# Patient Record
Sex: FEMALE | Race: WHITE | NOT HISPANIC OR LATINO | Employment: FULL TIME | URBAN - METROPOLITAN AREA
[De-identification: names, ages, dates, MRNs, and addresses within clinical notes are randomized per-mention and may not be internally consistent; named-entity substitution may affect disease eponyms.]

---

## 2023-09-22 ENCOUNTER — HOSPITAL ENCOUNTER (OUTPATIENT)
Dept: DATA CONVERSION | Facility: HOSPITAL | Age: 43
Discharge: HOME | End: 2023-09-22
Payer: COMMERCIAL

## 2023-09-22 DIAGNOSIS — Z90.3 ACQUIRED ABSENCE OF STOMACH (PART OF): ICD-10-CM

## 2023-09-22 DIAGNOSIS — K21.9 GASTRO-ESOPHAGEAL REFLUX DISEASE WITHOUT ESOPHAGITIS: ICD-10-CM

## 2023-09-26 ENCOUNTER — HOSPITAL ENCOUNTER (OUTPATIENT)
Dept: DATA CONVERSION | Facility: HOSPITAL | Age: 43
Discharge: HOME | End: 2023-09-26
Payer: COMMERCIAL

## 2023-09-26 DIAGNOSIS — Z12.31 ENCOUNTER FOR SCREENING MAMMOGRAM FOR MALIGNANT NEOPLASM OF BREAST: ICD-10-CM

## 2023-09-26 PROBLEM — K90.9 MALABSORPTION (HHS-HCC): Status: ACTIVE | Noted: 2023-09-26

## 2023-09-26 PROBLEM — G43.909 MIGRAINE: Status: ACTIVE | Noted: 2023-09-26

## 2023-09-26 PROBLEM — N92.0 MENORRHAGIA WITH REGULAR CYCLE: Status: ACTIVE | Noted: 2023-09-26

## 2023-09-26 PROBLEM — F32.A DEPRESSION: Status: ACTIVE | Noted: 2023-09-26

## 2023-09-26 PROBLEM — E21.3 HYPERPARATHYROIDISM (MULTI): Status: ACTIVE | Noted: 2023-09-26

## 2023-09-26 PROBLEM — R92.8 ABNORMAL FINDINGS ON DIAGNOSTIC IMAGING OF BREAST: Status: ACTIVE | Noted: 2023-09-26

## 2023-09-26 PROBLEM — D72.829 LEUCOCYTOSIS: Status: ACTIVE | Noted: 2023-09-26

## 2023-09-26 PROBLEM — E55.9 VITAMIN D DEFICIENCY: Status: ACTIVE | Noted: 2023-09-26

## 2023-09-26 PROBLEM — F41.9 ANXIETY: Status: ACTIVE | Noted: 2023-09-26

## 2023-09-26 PROBLEM — E66.9 OBESITY: Status: ACTIVE | Noted: 2023-09-26

## 2023-09-26 PROBLEM — K21.9 GASTROESOPHAGEAL REFLUX DISEASE: Status: ACTIVE | Noted: 2023-09-26

## 2023-09-26 RX ORDER — OMEPRAZOLE 40 MG/1
40 CAPSULE, DELAYED RELEASE ORAL
COMMUNITY
Start: 2023-02-10 | End: 2024-03-06 | Stop reason: ALTCHOICE

## 2023-09-26 RX ORDER — BUPROPION HYDROCHLORIDE 300 MG/1
300 TABLET ORAL EVERY MORNING
COMMUNITY
Start: 2017-08-15 | End: 2024-03-06 | Stop reason: ALTCHOICE

## 2023-09-26 RX ORDER — ONDANSETRON 4 MG/1
4 TABLET, FILM COATED ORAL EVERY 6 HOURS PRN
COMMUNITY
End: 2023-11-07 | Stop reason: ALTCHOICE

## 2023-09-26 RX ORDER — IBUPROFEN 800 MG/1
800 TABLET ORAL EVERY 8 HOURS PRN
COMMUNITY
End: 2024-03-06 | Stop reason: ALTCHOICE

## 2023-09-26 RX ORDER — ASPIRIN 81 MG
100 TABLET, DELAYED RELEASE (ENTERIC COATED) ORAL DAILY
COMMUNITY

## 2023-09-26 RX ORDER — CYANOCOBALAMIN 1000 UG/ML
1 INJECTION, SOLUTION INTRAMUSCULAR; SUBCUTANEOUS
COMMUNITY
End: 2024-02-06 | Stop reason: SDUPTHER

## 2023-09-26 RX ORDER — ENOXAPARIN SODIUM 100 MG/ML
40 INJECTION SUBCUTANEOUS DAILY
COMMUNITY
End: 2023-11-07 | Stop reason: ALTCHOICE

## 2023-09-26 RX ORDER — SIMETHICONE 80 MG
80 TABLET,CHEWABLE ORAL EVERY 6 HOURS PRN
COMMUNITY
End: 2023-11-07 | Stop reason: ALTCHOICE

## 2023-09-26 RX ORDER — SUMATRIPTAN SUCCINATE 100 MG/1
100 TABLET ORAL DAILY PRN
COMMUNITY
Start: 2017-10-13 | End: 2024-03-06 | Stop reason: ALTCHOICE

## 2023-09-26 RX ORDER — OXYCODONE HYDROCHLORIDE 5 MG/1
5 TABLET ORAL EVERY 6 HOURS PRN
COMMUNITY
End: 2023-11-07 | Stop reason: ALTCHOICE

## 2023-10-05 ENCOUNTER — OFFICE VISIT (OUTPATIENT)
Dept: SURGERY | Facility: CLINIC | Age: 43
End: 2023-10-05
Payer: COMMERCIAL

## 2023-10-05 VITALS
SYSTOLIC BLOOD PRESSURE: 120 MMHG | WEIGHT: 153 LBS | HEART RATE: 58 BPM | HEIGHT: 63 IN | DIASTOLIC BLOOD PRESSURE: 75 MMHG | BODY MASS INDEX: 27.11 KG/M2

## 2023-10-05 DIAGNOSIS — K44.9 HIATAL HERNIA: ICD-10-CM

## 2023-10-05 DIAGNOSIS — K90.89 OTHER SPECIFIED INTESTINAL MALABSORPTION (HHS-HCC): ICD-10-CM

## 2023-10-05 DIAGNOSIS — E21.3 HYPERPARATHYROIDISM (MULTI): ICD-10-CM

## 2023-10-05 DIAGNOSIS — K21.00 GASTROESOPHAGEAL REFLUX DISEASE WITH ESOPHAGITIS WITHOUT HEMORRHAGE: Primary | ICD-10-CM

## 2023-10-05 DIAGNOSIS — R11.10 REGURGITATION OF STOMACH CONTENTS: ICD-10-CM

## 2023-10-05 DIAGNOSIS — E55.9 VITAMIN D DEFICIENCY: ICD-10-CM

## 2023-10-05 PROCEDURE — 99215 OFFICE O/P EST HI 40 MIN: CPT | Performed by: SURGERY

## 2023-10-05 PROCEDURE — 1036F TOBACCO NON-USER: CPT | Performed by: SURGERY

## 2023-10-05 RX ORDER — FAMOTIDINE 40 MG/1
40 TABLET, FILM COATED ORAL 2 TIMES DAILY
Qty: 60 TABLET | Refills: 11 | Status: SHIPPED | OUTPATIENT
Start: 2023-10-05 | End: 2024-03-06 | Stop reason: WASHOUT

## 2023-10-05 RX ORDER — IBUPROFEN 200 MG
2 CAPSULE ORAL 2 TIMES DAILY
COMMUNITY

## 2023-10-05 ASSESSMENT — ENCOUNTER SYMPTOMS
DEPRESSION: 0
LOSS OF SENSATION IN FEET: 0
OCCASIONAL FEELINGS OF UNSTEADINESS: 0

## 2023-10-05 ASSESSMENT — COLUMBIA-SUICIDE SEVERITY RATING SCALE - C-SSRS
2. HAVE YOU ACTUALLY HAD ANY THOUGHTS OF KILLING YOURSELF?: NO
6. HAVE YOU EVER DONE ANYTHING, STARTED TO DO ANYTHING, OR PREPARED TO DO ANYTHING TO END YOUR LIFE?: NO
1. IN THE PAST MONTH, HAVE YOU WISHED YOU WERE DEAD OR WISHED YOU COULD GO TO SLEEP AND NOT WAKE UP?: NO

## 2023-10-05 ASSESSMENT — PATIENT HEALTH QUESTIONNAIRE - PHQ9
SUM OF ALL RESPONSES TO PHQ9 QUESTIONS 1 AND 2: 0
1. LITTLE INTEREST OR PLEASURE IN DOING THINGS: NOT AT ALL
2. FEELING DOWN, DEPRESSED OR HOPELESS: NOT AT ALL

## 2023-10-05 ASSESSMENT — PAIN SCALES - GENERAL: PAINLEVEL: 0-NO PAIN

## 2023-10-05 NOTE — PROGRESS NOTES
Bariatric Surgery F/U:          Seen at ER after surgery? No.  Hospital admission? DEHYDRATION  Bariatric reoperation? No.  Bariatric intervention? No.  Was anticoagulation initiated for presumed/confirmed Vein Thrombosis/PE? No.  Was an incisional hernia noted on exam? No.  Sleep Apnea? No.  Still using C-PAP? No.  GERD req. meds? YES Hyperlipidemia? No.  Still taking Cholesterol med? No.  Hypertension? No.  Still taking HTN med? No.  Number of Anti-hypertensive Medications: 0.  Diabetes? No.  Still taking DM med? No.  Current DM medication type: _____.         CONSTITUTIONAL:          Chills No.  Fatigue No.  Fever No.         CARDIOLOGY:          Negative for dizziness, chest pain, palpitations, shortness of breath.         RESPIRATORY:          Negative for chest congestion, cough, wheezing.         GASTROENTEROLOGY:          Food Intolerance No.  Acid reflux No.  Abdominal pain No.  Black stools No.  Constipation No.  Diarrhea No.  Loss of appetite no.  Nausea No.  Vomiting No.         UROLOGY:          Negative for dysuria, urinary urgency, kidney stones.         PSYCHOLOGY:          Negative for depression, anxiety, high stress level.     Start wt:  234     ideal wt: 137  start excess wt:  103   total wt loss:      ewl:         %

## 2023-10-05 NOTE — H&P
History Of Present Illness  Eileen Estrada is a 43 y.o. female with a history of vertical sleeve gastrectomy.  She has had progressive reflux over the past year.  She says her reflux started after her abdominoplasty.  Initially she was self treating with tums.  Her symptoms progressed so she started taking OTC pepcid.  She then graduated to OTC ppi.  When this did not work she received a prescription for PPI 40 mg daily.  She is taking a second dose of PPI 40 mg 3 times per week.  In addition, she is taking 2 tums five days per week.  She experiences subxiphoid burning in the evening when she relaxes prior to going to bed.  She has dysphagia to eggs and chicken.  She used to eat hardboiled eggs as part of her routine.  She now feels as if an egg will get stuck and she will need to vomit.  She tells me twice over the past six months, she has experienced vomiting bile when she bent over to pick something up off the floor.  She ends up vomiting once per week.  She had an upper GI showing a small hiatal hernia.  She had an EGD showing a normal volume and shape gastric sleeve without stricture.  We reviewed need for lifelong supplementation after weight loss surgery.     Past Medical History  She has a past medical history of Acid reflux, Anxiety, Depression, ETOH abuse, History of sleep study, and Migraine.    Surgical History  She has a past surgical history that includes Laparoscopic partial gastrectomy (07/05/2016); Hiatal hernia repair (12/27/2016); Hysterectomy (02/2022); Abdominal surgery (02/2022); and Bilateral salpingoophorectomy (Bilateral, 2017).     Social History  She reports that she has quit smoking. Her smoking use included cigarettes. She has never used smokeless tobacco. She reports that she does not drink alcohol and does not use drugs.    Family History  Family History   Problem Relation Name Age of Onset    Stroke Mother      Other (CVA) Mother      Other (Chemical Dependancy) Father      No Known  "Problems Brother      No Known Problems Daughter      No Known Problems Son      No Known Problems Maternal Grandmother      Coronary artery disease Maternal Grandfather      Breast cancer Paternal Grandmother      No Known Problems Paternal Grandfather          Allergies  Patient has no known allergies.    Review of Systems     Physical Exam     Last Recorded Vitals  Blood pressure 120/75, pulse 58, height 1.588 m (5' 2.5\"), weight 69.4 kg (153 lb).         Assessment/Plan   Problem List Items Addressed This Visit             ICD-10-CM       Endocrine/Metabolic    Vitamin D deficiency E55.9    Hyperparathyroidism (CMS/HCC) E21.3       Gastrointestinal and Abdominal    Gastroesophageal reflux disease - Primary K21.9    Relevant Medications    famotidine (Pepcid) 40 mg tablet    Malabsorption K90.9    Hiatal hernia K44.9    Regurgitation of stomach contents R11.10       I recommended conversion of VSG to RYGB with likely hiatal hernia repair.  We reviewed her UGI images as well as the EGD findings.  I explained the suspected cause of her symptoms.  We discussed that the two options would be EMOT to assess if she has normal esophageal function and if so that she could have hiatal hernia repair alone.  I explained that I could not predict the percentage likelihood that this would eliminate her symptoms and the durability of this option.  She is most comfortable with proceeding with conversion.  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  We reviewed in detail the differences between VSG and RYGB.  We reviewed dumping syndrome, sensitivity to alcohol, lifelong risk of gastrojejunal ulcer, need to avoid smoking, ASA, NSAIDs, steroids for life, and need for supplementation and routine labwork.  We reviewed the risks of surgery-death, VTE, bleeding, GI tract injury, adhesions, SBO, need for additional operations, chronic pain, nutritional deficiencies.  She would like to proceed.       I spent 60 " minutes in the professional and overall care of this patient.      Jordy Greco MD

## 2023-10-08 PROBLEM — R11.10 REGURGITATION OF STOMACH CONTENTS: Status: ACTIVE | Noted: 2023-10-08

## 2023-10-08 PROBLEM — K44.9 HIATAL HERNIA: Status: ACTIVE | Noted: 2023-10-08

## 2023-10-23 ENCOUNTER — PHARMACY VISIT (OUTPATIENT)
Dept: PHARMACY | Facility: CLINIC | Age: 43
End: 2023-10-23
Payer: COMMERCIAL

## 2023-10-23 PROCEDURE — RXMED WILLOW AMBULATORY MEDICATION CHARGE

## 2023-10-25 RX ORDER — SUMATRIPTAN 50 MG/1
TABLET, FILM COATED ORAL
Qty: 30 TABLET | Refills: 2 | OUTPATIENT
Start: 2023-10-25 | End: 2024-10-24

## 2023-10-27 ENCOUNTER — HOSPITAL ENCOUNTER (OUTPATIENT)
Facility: HOSPITAL | Age: 43
Setting detail: SURGERY ADMIT
End: 2023-10-27
Attending: SURGERY | Admitting: SURGERY

## 2023-10-27 DIAGNOSIS — K21.9 GASTROESOPHAGEAL REFLUX DISEASE, UNSPECIFIED WHETHER ESOPHAGITIS PRESENT: ICD-10-CM

## 2023-10-31 ENCOUNTER — PHARMACY VISIT (OUTPATIENT)
Dept: PHARMACY | Facility: CLINIC | Age: 43
End: 2023-10-31
Payer: COMMERCIAL

## 2023-10-31 RX ORDER — BUPROPION HYDROCHLORIDE 300 MG/1
300 TABLET ORAL
Qty: 90 TABLET | Refills: 0 | OUTPATIENT
Start: 2023-10-31 | End: 2024-10-30

## 2023-10-31 RX ORDER — FAMOTIDINE 40 MG/1
TABLET, FILM COATED ORAL 2 TIMES DAILY
Qty: 60 TABLET | Refills: 11 | OUTPATIENT
Start: 2023-10-05 | End: 2023-11-07 | Stop reason: ALTCHOICE

## 2023-11-07 ENCOUNTER — CLINICAL SUPPORT (OUTPATIENT)
Dept: SURGERY | Facility: CLINIC | Age: 43
End: 2023-11-07
Payer: COMMERCIAL

## 2023-11-07 ENCOUNTER — OFFICE VISIT (OUTPATIENT)
Dept: SURGERY | Facility: CLINIC | Age: 43
End: 2023-11-07
Payer: COMMERCIAL

## 2023-11-07 VITALS
RESPIRATION RATE: 16 BRPM | HEART RATE: 86 BPM | DIASTOLIC BLOOD PRESSURE: 72 MMHG | SYSTOLIC BLOOD PRESSURE: 115 MMHG | WEIGHT: 152 LBS | HEIGHT: 63 IN | BODY MASS INDEX: 26.93 KG/M2

## 2023-11-07 DIAGNOSIS — D68.9 COAGULATION DISORDER (MULTI): Primary | ICD-10-CM

## 2023-11-07 DIAGNOSIS — K21.9 GASTROESOPHAGEAL REFLUX DISEASE WITHOUT ESOPHAGITIS: ICD-10-CM

## 2023-11-07 DIAGNOSIS — E63.9 NUTRITIONAL DEFICIENCY: ICD-10-CM

## 2023-11-07 DIAGNOSIS — E51.9 THIAMINE DEFICIENCY: ICD-10-CM

## 2023-11-07 DIAGNOSIS — D50.8 IRON DEFICIENCY ANEMIA SECONDARY TO INADEQUATE DIETARY IRON INTAKE: ICD-10-CM

## 2023-11-07 DIAGNOSIS — E55.9 VITAMIN D DEFICIENCY: ICD-10-CM

## 2023-11-07 DIAGNOSIS — Z71.3 ENCOUNTER FOR NUTRITION EVALUATION PRIOR TO BARIATRIC SURGERY: ICD-10-CM

## 2023-11-07 DIAGNOSIS — E53.8 VITAMIN B12 DEFICIENCY: ICD-10-CM

## 2023-11-07 DIAGNOSIS — E61.0 COPPER DEFICIENCY: ICD-10-CM

## 2023-11-07 DIAGNOSIS — Z01.818 PRE-OP EVALUATION: ICD-10-CM

## 2023-11-07 DIAGNOSIS — R73.9 HYPERGLYCEMIA: ICD-10-CM

## 2023-11-07 DIAGNOSIS — E07.9 DISEASE OF THYROID GLAND: ICD-10-CM

## 2023-11-07 PROCEDURE — 93000 ELECTROCARDIOGRAM COMPLETE: CPT | Performed by: INTERNAL MEDICINE

## 2023-11-07 PROCEDURE — 1036F TOBACCO NON-USER: CPT | Performed by: INTERNAL MEDICINE

## 2023-11-07 PROCEDURE — 99203 OFFICE O/P NEW LOW 30 MIN: CPT | Performed by: INTERNAL MEDICINE

## 2023-11-07 ASSESSMENT — ENCOUNTER SYMPTOMS
VOMITING: 1
ROS GI COMMENTS: ACID REFLUX
DIZZINESS: 0
ABDOMINAL PAIN: 0
DEPRESSION: 0
NAUSEA: 0
SHORTNESS OF BREATH: 0
OCCASIONAL FEELINGS OF UNSTEADINESS: 0
COUGH: 0
ARTHRALGIAS: 0
PALPITATIONS: 0
DIARRHEA: 0
LOSS OF SENSATION IN FEET: 0
TROUBLE SWALLOWING: 1
CONSTIPATION: 0

## 2023-11-07 ASSESSMENT — PAIN SCALES - GENERAL: PAINLEVEL: 0-NO PAIN

## 2023-11-07 NOTE — PROGRESS NOTES
Pre-Operative Dietary Education     Current Weight: 152 lb  Current BMI: 27.36    In class settings, reviewed thin liquids diet that patient will be required to follow for 2 weeks after surgery. Reviewed low calorie fluids along with protein shakes and products that can be consumed. Emphasized the importance of following diet guidelines and recommendations after surgery to prevent complications. Addressed liquids and items to avoid at this time. Patients are educated to drink at least 50 oz of fluid per day, and gradually take in 50g protein per day. Briefly reviewed the diet progression for the next 3-4 months, which will also be discussed at each follow up appointment after surgery. Also reviewed supplementation after bariatric surgery. Patient was instructed to take chewable MVI with iron once daily for the first 6 weeks after surgery. Other supplementation will be introduced at 6 week follow up appointment.       Hilda Cuenca RD, LDN  Registered Dietitian, Licensed Dietitian Nutritionist

## 2023-11-07 NOTE — PROGRESS NOTES
Patient having revision to RYGB from sleeve due to severe GERD. Sleep study and bariatric labs not needed.

## 2023-11-07 NOTE — PROGRESS NOTES
"Subjective   Patient ID: Eileen Estrada is a 43 y.o. female who presents for preoperative clearance for gastric bypass surgery due to acid reflux. Currently has 3 meals a day, protein with each meal. She reports dysphagia and vomits for relief. Regarding exercise, she lifts weights and uses elliptical. Denies snoring and having sleep apnea. Denies personal and family hx of blood clots.    Diagnostics Reviewed: 11/2023 EKG NSR. 2014 negative sleep study.  Labs Reviewed:         Review of Systems   HENT:  Positive for trouble swallowing.    Respiratory:  Negative for cough and shortness of breath.    Cardiovascular:  Negative for chest pain and palpitations.   Gastrointestinal:  Positive for vomiting. Negative for abdominal pain, constipation, diarrhea and nausea.        Acid reflux   Musculoskeletal:  Negative for arthralgias.   Neurological:  Negative for dizziness.       Objective   /72   Pulse 86   Resp 16   Ht 1.588 m (5' 2.5\")   Wt 68.9 kg (152 lb)   BMI 27.36 kg/m²     Physical Exam  Constitutional:       Appearance: Normal appearance.   HENT:      Mouth/Throat:      Comments: Dentition ok  Cardiovascular:      Rate and Rhythm: Normal rate and regular rhythm.   Pulmonary:      Breath sounds: Normal breath sounds.   Abdominal:      General: Bowel sounds are normal.      Palpations: Abdomen is soft.   Musculoskeletal:         General: No swelling.   Neurological:      Mental Status: She is alert.         Assessment/Plan   Diagnoses and all orders for this visit:  Coagulation disorder (CMS/[facility])  -     CBC and Auto Differential; Future  -     aPTT; Future  -     Hemoglobin A1C; Future  -     Folate; Future  -     Ferritin; Future  -     Comprehensive Metabolic Panel; Future  -     Protime-INR; Future  -     Vitamin B12; Future  -     Parathyroid Hormone, Intact; Future  -     Vitamin B1, Whole Blood; Future  -     Copper, Blood; Future  -     Vitamin A; Future  -     Vitamin E; Future  -     Zinc, Serum " or Plasma; Future  -     Iron and TIBC; Future  Pre-op evaluation  -     ECG 12 lead (Clinic Performed)  -     CBC and Auto Differential; Future  -     aPTT; Future  -     Hemoglobin A1C; Future  -     Folate; Future  -     Ferritin; Future  -     Comprehensive Metabolic Panel; Future  -     Protime-INR; Future  -     Vitamin B12; Future  -     Parathyroid Hormone, Intact; Future  -     Vitamin B1, Whole Blood; Future  -     Copper, Blood; Future  -     Vitamin A; Future  -     Vitamin E; Future  -     Zinc, Serum or Plasma; Future  -     Iron and TIBC; Future  Copper deficiency  -     CBC and Auto Differential; Future  -     aPTT; Future  -     Hemoglobin A1C; Future  -     Folate; Future  -     Ferritin; Future  -     Comprehensive Metabolic Panel; Future  -     Protime-INR; Future  -     Vitamin B12; Future  -     Parathyroid Hormone, Intact; Future  -     Vitamin B1, Whole Blood; Future  -     Copper, Blood; Future  -     Vitamin A; Future  -     Vitamin E; Future  -     Zinc, Serum or Plasma; Future  -     Iron and TIBC; Future  Encounter for nutrition evaluation prior to bariatric surgery  -     CBC and Auto Differential; Future  -     aPTT; Future  -     Hemoglobin A1C; Future  -     Folate; Future  -     Ferritin; Future  -     Comprehensive Metabolic Panel; Future  -     Protime-INR; Future  -     Vitamin B12; Future  -     Parathyroid Hormone, Intact; Future  -     Vitamin B1, Whole Blood; Future  -     Copper, Blood; Future  -     Vitamin A; Future  -     Vitamin E; Future  -     Zinc, Serum or Plasma; Future  -     Iron and TIBC; Future  Hyperglycemia  -     CBC and Auto Differential; Future  -     aPTT; Future  -     Hemoglobin A1C; Future  -     Folate; Future  -     Ferritin; Future  -     Comprehensive Metabolic Panel; Future  -     Protime-INR; Future  -     Vitamin B12; Future  -     Parathyroid Hormone, Intact; Future  -     Vitamin B1, Whole Blood; Future  -     Copper, Blood; Future  -      Vitamin A; Future  -     Vitamin E; Future  -     Zinc, Serum or Plasma; Future  -     Iron and TIBC; Future  Disease of thyroid gland  -     CBC and Auto Differential; Future  -     aPTT; Future  -     Hemoglobin A1C; Future  -     Folate; Future  -     Ferritin; Future  -     Comprehensive Metabolic Panel; Future  -     Protime-INR; Future  -     Vitamin B12; Future  -     Parathyroid Hormone, Intact; Future  -     Vitamin B1, Whole Blood; Future  -     Copper, Blood; Future  -     Vitamin A; Future  -     Vitamin E; Future  -     Zinc, Serum or Plasma; Future  -     Iron and TIBC; Future  Iron deficiency anemia secondary to inadequate dietary iron intake  -     CBC and Auto Differential; Future  -     aPTT; Future  -     Hemoglobin A1C; Future  -     Folate; Future  -     Ferritin; Future  -     Comprehensive Metabolic Panel; Future  -     Protime-INR; Future  -     Vitamin B12; Future  -     Parathyroid Hormone, Intact; Future  -     Vitamin B1, Whole Blood; Future  -     Copper, Blood; Future  -     Vitamin A; Future  -     Vitamin E; Future  -     Zinc, Serum or Plasma; Future  -     Iron and TIBC; Future  Nutritional deficiency  -     CBC and Auto Differential; Future  -     aPTT; Future  -     Hemoglobin A1C; Future  -     Folate; Future  -     Ferritin; Future  -     Comprehensive Metabolic Panel; Future  -     Protime-INR; Future  -     Vitamin B12; Future  -     Parathyroid Hormone, Intact; Future  -     Vitamin B1, Whole Blood; Future  -     Copper, Blood; Future  -     Vitamin A; Future  -     Vitamin E; Future  -     Zinc, Serum or Plasma; Future  -     Iron and TIBC; Future  Thiamine deficiency  -     CBC and Auto Differential; Future  -     aPTT; Future  -     Hemoglobin A1C; Future  -     Folate; Future  -     Ferritin; Future  -     Comprehensive Metabolic Panel; Future  -     Protime-INR; Future  -     Vitamin B12; Future  -     Parathyroid Hormone, Intact; Future  -     Vitamin B1, Whole Blood;  Future  -     Copper, Blood; Future  -     Vitamin A; Future  -     Vitamin E; Future  -     Zinc, Serum or Plasma; Future  -     Iron and TIBC; Future  Vitamin B12 deficiency  -     CBC and Auto Differential; Future  -     aPTT; Future  -     Hemoglobin A1C; Future  -     Folate; Future  -     Ferritin; Future  -     Comprehensive Metabolic Panel; Future  -     Protime-INR; Future  -     Vitamin B12; Future  -     Parathyroid Hormone, Intact; Future  -     Vitamin B1, Whole Blood; Future  -     Copper, Blood; Future  -     Vitamin A; Future  -     Vitamin E; Future  -     Zinc, Serum or Plasma; Future  -     Iron and TIBC; Future  Vitamin D deficiency  -     CBC and Auto Differential; Future  -     aPTT; Future  -     Hemoglobin A1C; Future  -     Folate; Future  -     Ferritin; Future  -     Comprehensive Metabolic Panel; Future  -     Protime-INR; Future  -     Vitamin B12; Future  -     Parathyroid Hormone, Intact; Future  -     Vitamin B1, Whole Blood; Future  -     Copper, Blood; Future  -     Vitamin A; Future  -     Vitamin E; Future  -     Zinc, Serum or Plasma; Future  -     Iron and TIBC; Future  Gastroesophageal reflux disease without esophagitis        Scribe Attestation  By signing my name below, Verna ROCHE Scribe attest that this documentation has been prepared under the direction and in the presence of Aretha Agarwal MD.

## 2023-11-08 DIAGNOSIS — F41.9 ANXIETY: ICD-10-CM

## 2023-11-08 DIAGNOSIS — E53.8 VITAMIN B12 DEFICIENCY: Primary | ICD-10-CM

## 2023-11-08 DIAGNOSIS — K21.9 GASTROESOPHAGEAL REFLUX DISEASE, UNSPECIFIED WHETHER ESOPHAGITIS PRESENT: ICD-10-CM

## 2023-11-08 DIAGNOSIS — G43.819 OTHER MIGRAINE WITHOUT STATUS MIGRAINOSUS, INTRACTABLE: Primary | ICD-10-CM

## 2023-11-08 RX ORDER — BUPROPION HYDROCHLORIDE 300 MG/1
300 TABLET ORAL
Qty: 90 TABLET | Refills: 0 | OUTPATIENT
Start: 2023-11-08 | End: 2024-11-07

## 2023-11-08 RX ORDER — SUMATRIPTAN 50 MG/1
TABLET, FILM COATED ORAL
Qty: 30 TABLET | Refills: 2 | OUTPATIENT
Start: 2023-11-08 | End: 2024-11-07

## 2023-11-09 ENCOUNTER — LAB (OUTPATIENT)
Dept: LAB | Facility: LAB | Age: 43
End: 2023-11-09
Payer: COMMERCIAL

## 2023-11-09 ENCOUNTER — PHARMACY VISIT (OUTPATIENT)
Dept: PHARMACY | Facility: CLINIC | Age: 43
End: 2023-11-09
Payer: COMMERCIAL

## 2023-11-09 DIAGNOSIS — D68.9 COAGULATION DISORDER (MULTI): ICD-10-CM

## 2023-11-09 DIAGNOSIS — R73.9 HYPERGLYCEMIA: ICD-10-CM

## 2023-11-09 DIAGNOSIS — K21.9 GASTROESOPHAGEAL REFLUX DISEASE, UNSPECIFIED WHETHER ESOPHAGITIS PRESENT: Primary | ICD-10-CM

## 2023-11-09 DIAGNOSIS — E51.9 THIAMINE DEFICIENCY: ICD-10-CM

## 2023-11-09 DIAGNOSIS — Z71.3 ENCOUNTER FOR NUTRITION EVALUATION PRIOR TO BARIATRIC SURGERY: ICD-10-CM

## 2023-11-09 DIAGNOSIS — Z01.818 PRE-OP EVALUATION: ICD-10-CM

## 2023-11-09 DIAGNOSIS — E55.9 VITAMIN D DEFICIENCY: ICD-10-CM

## 2023-11-09 DIAGNOSIS — D50.8 IRON DEFICIENCY ANEMIA SECONDARY TO INADEQUATE DIETARY IRON INTAKE: ICD-10-CM

## 2023-11-09 DIAGNOSIS — E53.8 VITAMIN B12 DEFICIENCY: ICD-10-CM

## 2023-11-09 DIAGNOSIS — E63.9 NUTRITIONAL DEFICIENCY: ICD-10-CM

## 2023-11-09 DIAGNOSIS — E07.9 DISEASE OF THYROID GLAND: ICD-10-CM

## 2023-11-09 DIAGNOSIS — E61.0 COPPER DEFICIENCY: ICD-10-CM

## 2023-11-09 LAB
ALBUMIN SERPL-MCNC: 4.2 G/DL (ref 3.5–5)
ALP BLD-CCNC: 44 U/L (ref 35–125)
ALT SERPL-CCNC: 17 U/L (ref 5–40)
ANION GAP SERPL CALC-SCNC: 12 MMOL/L
APTT PPP: 24.5 SECONDS (ref 22–32.5)
AST SERPL-CCNC: 20 U/L (ref 5–40)
BASOPHILS # BLD AUTO: 0.06 X10*3/UL (ref 0–0.1)
BASOPHILS NFR BLD AUTO: 0.9 %
BILIRUB SERPL-MCNC: 0.3 MG/DL (ref 0.1–1.2)
BUN SERPL-MCNC: 19 MG/DL (ref 8–25)
CALCIUM SERPL-MCNC: 9.1 MG/DL (ref 8.5–10.4)
CHLORIDE SERPL-SCNC: 104 MMOL/L (ref 97–107)
CO2 SERPL-SCNC: 24 MMOL/L (ref 24–31)
CREAT SERPL-MCNC: 1 MG/DL (ref 0.4–1.6)
EOSINOPHIL # BLD AUTO: 0.07 X10*3/UL (ref 0–0.7)
EOSINOPHIL NFR BLD AUTO: 1 %
ERYTHROCYTE [DISTWIDTH] IN BLOOD BY AUTOMATED COUNT: 13.3 % (ref 11.5–14.5)
EST. AVERAGE GLUCOSE BLD GHB EST-MCNC: 111 MG/DL
FERRITIN SERPL-MCNC: 31 NG/ML (ref 13–150)
FOLATE SERPL-MCNC: 11.1 NG/ML (ref 4.2–19.9)
GFR SERPL CREATININE-BSD FRML MDRD: 71 ML/MIN/1.73M*2
GLUCOSE SERPL-MCNC: 88 MG/DL (ref 65–99)
HBA1C MFR BLD: 5.5 %
HCT VFR BLD AUTO: 42.3 % (ref 36–46)
HGB BLD-MCNC: 14.1 G/DL (ref 12–16)
IMM GRANULOCYTES # BLD AUTO: 0.01 X10*3/UL (ref 0–0.7)
IMM GRANULOCYTES NFR BLD AUTO: 0.1 % (ref 0–0.9)
INR PPP: 1.2 (ref 0.9–1.2)
IRON SATN MFR SERPL: 30 % (ref 12–50)
IRON SERPL-MCNC: 106 UG/DL (ref 30–160)
LYMPHOCYTES # BLD AUTO: 2.33 X10*3/UL (ref 1.2–4.8)
LYMPHOCYTES NFR BLD AUTO: 34.6 %
MCH RBC QN AUTO: 30.3 PG (ref 26–34)
MCHC RBC AUTO-ENTMCNC: 33.3 G/DL (ref 32–36)
MCV RBC AUTO: 91 FL (ref 80–100)
MONOCYTES # BLD AUTO: 0.58 X10*3/UL (ref 0.1–1)
MONOCYTES NFR BLD AUTO: 8.6 %
NEUTROPHILS # BLD AUTO: 3.68 X10*3/UL (ref 1.2–7.7)
NEUTROPHILS NFR BLD AUTO: 54.8 %
NRBC BLD-RTO: 0 /100 WBCS (ref 0–0)
PLATELET # BLD AUTO: 256 X10*3/UL (ref 150–450)
POTASSIUM SERPL-SCNC: 4.5 MMOL/L (ref 3.4–5.1)
PROT SERPL-MCNC: 6.3 G/DL (ref 5.9–7.9)
PROTHROMBIN TIME: 12.2 SECONDS (ref 9.3–12.7)
RBC # BLD AUTO: 4.66 X10*6/UL (ref 4–5.2)
SODIUM SERPL-SCNC: 140 MMOL/L (ref 133–145)
TIBC SERPL-MCNC: 355 UG/DL (ref 228–428)
UIBC SERPL-MCNC: 249 UG/DL (ref 110–370)
VIT B12 SERPL-MCNC: 1393 PG/ML (ref 211–946)
WBC # BLD AUTO: 6.7 X10*3/UL (ref 4.4–11.3)

## 2023-11-09 PROCEDURE — 83036 HEMOGLOBIN GLYCOSYLATED A1C: CPT

## 2023-11-09 PROCEDURE — 85730 THROMBOPLASTIN TIME PARTIAL: CPT

## 2023-11-09 PROCEDURE — 84590 ASSAY OF VITAMIN A: CPT

## 2023-11-09 PROCEDURE — 82728 ASSAY OF FERRITIN: CPT

## 2023-11-09 PROCEDURE — 84425 ASSAY OF VITAMIN B-1: CPT

## 2023-11-09 PROCEDURE — 82607 VITAMIN B-12: CPT

## 2023-11-09 PROCEDURE — RXMED WILLOW AMBULATORY MEDICATION CHARGE

## 2023-11-09 PROCEDURE — 82746 ASSAY OF FOLIC ACID SERUM: CPT

## 2023-11-09 PROCEDURE — 85025 COMPLETE CBC W/AUTO DIFF WBC: CPT

## 2023-11-09 PROCEDURE — 84446 ASSAY OF VITAMIN E: CPT

## 2023-11-09 PROCEDURE — 83970 ASSAY OF PARATHORMONE: CPT

## 2023-11-09 PROCEDURE — 83540 ASSAY OF IRON: CPT

## 2023-11-09 PROCEDURE — 36415 COLL VENOUS BLD VENIPUNCTURE: CPT

## 2023-11-09 PROCEDURE — 80053 COMPREHEN METABOLIC PANEL: CPT

## 2023-11-09 PROCEDURE — 83550 IRON BINDING TEST: CPT

## 2023-11-09 PROCEDURE — 85610 PROTHROMBIN TIME: CPT

## 2023-11-09 PROCEDURE — 84630 ASSAY OF ZINC: CPT

## 2023-11-09 PROCEDURE — 82525 ASSAY OF COPPER: CPT

## 2023-11-09 RX ORDER — FAMOTIDINE 40 MG/1
40 TABLET, FILM COATED ORAL 2 TIMES DAILY
Qty: 180 TABLET | Refills: 3 | Status: SHIPPED | OUTPATIENT
Start: 2023-11-09 | End: 2024-11-08

## 2023-11-10 ENCOUNTER — PHARMACY VISIT (OUTPATIENT)
Dept: PHARMACY | Facility: CLINIC | Age: 43
End: 2023-11-10
Payer: COMMERCIAL

## 2023-11-10 LAB — PTH-INTACT SERPL-MCNC: 65.1 PG/ML (ref 18.5–88)

## 2023-11-10 PROCEDURE — RXMED WILLOW AMBULATORY MEDICATION CHARGE

## 2023-11-10 RX ORDER — BUPROPION HYDROCHLORIDE 300 MG/1
300 TABLET ORAL
Qty: 90 TABLET | Refills: 1 | Status: SHIPPED | OUTPATIENT
Start: 2023-11-10 | End: 2024-11-09

## 2023-11-10 RX ORDER — SUMATRIPTAN 50 MG/1
TABLET, FILM COATED ORAL
Qty: 30 TABLET | Refills: 2 | Status: SHIPPED | OUTPATIENT
Start: 2023-11-10 | End: 2024-11-09

## 2023-11-11 LAB
COPPER SERPL-MCNC: 88.7 UG/DL (ref 80–155)
ZINC SERPL-MCNC: 65.9 UG/DL (ref 60–120)

## 2023-11-12 LAB
A-TOCOPHEROL VIT E SERPL-MCNC: 9.6 MG/L (ref 5.5–18)
ANNOTATION COMMENT IMP: NORMAL
BETA+GAMMA TOCOPHEROL SERPL-MCNC: 1 MG/L (ref 0–6)
RETINYL PALMITATE SERPL-MCNC: <0.02 MG/L (ref 0–0.1)
VIT A SERPL-MCNC: 0.51 MG/L (ref 0.3–1.2)

## 2023-11-13 ENCOUNTER — PHARMACY VISIT (OUTPATIENT)
Dept: PHARMACY | Facility: CLINIC | Age: 43
End: 2023-11-13
Payer: COMMERCIAL

## 2023-11-13 RX ORDER — OMEPRAZOLE 40 MG/1
40 CAPSULE, DELAYED RELEASE ORAL
Qty: 30 CAPSULE | Refills: 1 | Status: SHIPPED | OUTPATIENT
Start: 2023-11-13 | End: 2024-03-14 | Stop reason: SDUPTHER

## 2023-11-13 RX ORDER — CYANOCOBALAMIN 1000 UG/ML
1000 INJECTION, SOLUTION INTRAMUSCULAR; SUBCUTANEOUS
Qty: 1 ML | Refills: 1 | Status: SHIPPED | OUTPATIENT
Start: 2023-11-13 | End: 2024-03-06 | Stop reason: WASHOUT

## 2023-11-14 LAB — VIT B1 PYROPHOSHATE BLD-SCNC: 118 NMOL/L (ref 70–180)

## 2023-12-04 ENCOUNTER — APPOINTMENT (OUTPATIENT)
Dept: SURGERY | Facility: CLINIC | Age: 43
End: 2023-12-04
Payer: COMMERCIAL

## 2023-12-18 ENCOUNTER — APPOINTMENT (OUTPATIENT)
Dept: SURGERY | Facility: CLINIC | Age: 43
End: 2023-12-18
Payer: COMMERCIAL

## 2023-12-22 ENCOUNTER — PHARMACY VISIT (OUTPATIENT)
Dept: PHARMACY | Facility: CLINIC | Age: 43
End: 2023-12-22
Payer: COMMERCIAL

## 2023-12-22 PROCEDURE — RXMED WILLOW AMBULATORY MEDICATION CHARGE

## 2024-01-04 ENCOUNTER — APPOINTMENT (OUTPATIENT)
Dept: SURGERY | Facility: CLINIC | Age: 44
End: 2024-01-04
Payer: COMMERCIAL

## 2024-01-15 PROCEDURE — RXMED WILLOW AMBULATORY MEDICATION CHARGE

## 2024-01-22 ENCOUNTER — PHARMACY VISIT (OUTPATIENT)
Dept: PHARMACY | Facility: CLINIC | Age: 44
End: 2024-01-22
Payer: COMMERCIAL

## 2024-01-22 PROCEDURE — RXOTC WILLOW AMBULATORY OTC CHARGE

## 2024-02-05 PROCEDURE — RXMED WILLOW AMBULATORY MEDICATION CHARGE

## 2024-02-06 DIAGNOSIS — K90.9 INTESTINAL MALABSORPTION, UNSPECIFIED TYPE (HHS-HCC): ICD-10-CM

## 2024-02-06 PROCEDURE — RXMED WILLOW AMBULATORY MEDICATION CHARGE

## 2024-02-06 RX ORDER — CYANOCOBALAMIN 1000 UG/ML
1000 INJECTION, SOLUTION INTRAMUSCULAR; SUBCUTANEOUS
Qty: 3 ML | Refills: 3 | Status: SHIPPED | OUTPATIENT
Start: 2024-02-06 | End: 2025-02-05

## 2024-02-13 ENCOUNTER — PHARMACY VISIT (OUTPATIENT)
Dept: PHARMACY | Facility: CLINIC | Age: 44
End: 2024-02-13
Payer: COMMERCIAL

## 2024-03-06 ENCOUNTER — OFFICE VISIT (OUTPATIENT)
Dept: VASCULAR SURGERY | Facility: CLINIC | Age: 44
End: 2024-03-06
Payer: COMMERCIAL

## 2024-03-06 VITALS
HEIGHT: 62 IN | HEART RATE: 64 BPM | WEIGHT: 149 LBS | SYSTOLIC BLOOD PRESSURE: 108 MMHG | DIASTOLIC BLOOD PRESSURE: 64 MMHG | BODY MASS INDEX: 27.42 KG/M2

## 2024-03-06 DIAGNOSIS — I87.2 PERIPHERAL VENOUS INSUFFICIENCY: ICD-10-CM

## 2024-03-06 DIAGNOSIS — I83.893 SYMPTOMATIC VARICOSE VEINS OF BOTH LOWER EXTREMITIES: Primary | ICD-10-CM

## 2024-03-06 PROCEDURE — 1036F TOBACCO NON-USER: CPT | Performed by: NURSE PRACTITIONER

## 2024-03-06 PROCEDURE — 99203 OFFICE O/P NEW LOW 30 MIN: CPT | Performed by: NURSE PRACTITIONER

## 2024-03-06 PROCEDURE — 99213 OFFICE O/P EST LOW 20 MIN: CPT | Performed by: NURSE PRACTITIONER

## 2024-03-06 ASSESSMENT — ENCOUNTER SYMPTOMS
LOSS OF SENSATION IN FEET: 0
DEPRESSION: 0
OCCASIONAL FEELINGS OF UNSTEADINESS: 0

## 2024-03-06 ASSESSMENT — PAIN SCALES - GENERAL: PAINLEVEL: 0-NO PAIN

## 2024-03-06 ASSESSMENT — COLUMBIA-SUICIDE SEVERITY RATING SCALE - C-SSRS
1. IN THE PAST MONTH, HAVE YOU WISHED YOU WERE DEAD OR WISHED YOU COULD GO TO SLEEP AND NOT WAKE UP?: NO
6. HAVE YOU EVER DONE ANYTHING, STARTED TO DO ANYTHING, OR PREPARED TO DO ANYTHING TO END YOUR LIFE?: NO

## 2024-03-06 NOTE — PROGRESS NOTES
History Of Present Illness  Eileen Estrada is a 43 y.o. female presenting with symptomatic varicose veins.  Patient has had prior venous intervention at [facility], Dr. Morocho, approximately around 2018.  She has had prior greater saphenous vein ablation.  She notes that over the past several years her varicose veins have recurred and she has developed symptoms of aching and throbbing.  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  She does wear gradient compression stockings daily, elevate her legs is much as possible, and takes over-the-counter analgesics when she has symptoms.  No history of DVT or superficial phlebitis.  No complaints of claudication or ischemic rest pain.     Past Medical History  She has a past medical history of Acid reflux, Anxiety, Depression, ETOH abuse, History of sleep study, and Migraine.    Surgical History  She has a past surgical history that includes Laparoscopic partial gastrectomy (07/05/2016); Hiatal hernia repair (12/27/2016); Hysterectomy (02/2022); Abdominal surgery (02/2022); and Bilateral salpingoophorectomy (Bilateral, 2017).     Social History  She reports that she has quit smoking. Her smoking use included cigarettes. She has never been exposed to tobacco smoke. She has never used smokeless tobacco. She reports that she does not currently use alcohol. She reports that she does not use drugs.    Family History  Family History   Problem Relation Name Age of Onset    Stroke Mother      Other (CVA) Mother      Other (Chemical Dependancy) Father      No Known Problems Brother      No Known Problems Daughter      No Known Problems Son      No Known Problems Maternal Grandmother      Coronary artery disease Maternal Grandfather      Breast cancer Paternal Grandmother      No Known Problems Paternal Grandfather          Allergies  Patient has no known allergies.    Review of Systems    CONSTITUTIONAL:  Denies weight loss, fever and chills.    HEENT: Denies changes in vision and hearing.    RESPIRATORY: Denies SOB and cough.    CV: Denies palpitations and CP.    GI: Denies abdominal pain, nausea, vomiting and diarrhea.    : Denies dysuria and urinary frequency.    MSK: Denies myalgia and joint pain.    SKIN: Denies rash and pruritus.    VASC: Denies claudication, ischemic rest pain, or open wounds or sores.  Positive for symptomatic varicose veins bilaterally    NEUROLOGICAL: Denies headache and syncope.    PSYCHIATRIC: Denies recent changes in mood. Denies anxiety and depression.     Physical Exam    General: Pt is alert and oriented x 3. Pleasant, conversive  HEENT: Head is atraumatic, normocephalic.   Cardiac: Normal S1-S2.  Regular rate and rhythm.  No murmurs.  Respiratory: Lungs clear to auscultation.  No adventitious sounds.  Extremities: No significant edema noted.  Extremities are warm to the touch and normal in color.  No open wounds or sores.  3 to 4 mm moderate pressure varices scattered to bilateral lower extremities.  Neuro: Moves all extremities spontaneously.  No focal deficits.  Psych: Appropriate affect.  Answers questions appropriately.     Last Recorded Vitals  /64 (BP Location: Left arm, Patient Position: Sitting)   Pulse 64   Wt 67.6 kg (149 lb)     Relevant Results    Current Outpatient Medications   Medication Instructions    buPROPion XL (Wellbutrin XL) 300 mg 24 hr tablet TAKE 1 TABLET BY MOUTH EVERY MORNING    calcium citrate (Calcitrate) 200 mg (950 mg) tablet 2 tablets, oral, 2 times daily    cyanocobalamin (VITAMIN B-12) 1,000 mcg, intramuscular, Every 30 days    cyanocobalamin (VITAMIN B-12) 1,000 mcg, intramuscular, Every 30 days    docusate sodium (COLACE) 100 mg, oral, Daily    famotidine (PEPCID) 40 mg, oral, 2 times daily    famotidine (PEPCID) 40 mg, oral, 2 times daily    MULTIVITAMIN ORAL 1 tablet, oral, Daily    omeprazole (PRILOSEC) 40 mg, oral, Daily before  breakfast    SUMAtriptan (Imitrex) 50 mg tablet TAKE 1 TABLET BY MOUTH TWO TIMES A DAY AS NEEDED. WAIT AT LEAST 2 HOURS BETWEEN DOSES         Assessment/Plan   Symptomatic varicose veins bilaterally  Venous insufficiency    Patient with prior history of greater saphenous vein ablation in [facility] in 2018.  Presents with recurrent varicosities that are symptomatic bilaterally.  She describes aching and throbbing in her lower extremities.  She has been wearing compression stockings, elevating her legs, taking over-the-counter analgesics as needed for discomfort.  Despite these conservative measures, she still has significant symptoms.  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  Plan for a venous insufficiency study for further evaluation.  Patient may need sclerotherapy assuming that her prior greater saphenous vein ablation remains closed.       Jose Bean, APRN-CNP

## 2024-03-06 NOTE — PATIENT INSTRUCTIONS
Eileen,    It was good to see you!  Thank you for allow me to participate in your care.    We discussed your symptomatic varicose veins.  I recommend a venous insufficiency study to be completed in the near future.  Please continue to wear your gradient compression stockings, elevate your legs, take over-the-counter analgesics as needed.  Plan for a virtual visit in 1 to 2 weeks after your ultrasound.

## 2024-03-11 ENCOUNTER — APPOINTMENT (OUTPATIENT)
Dept: VASCULAR SURGERY | Facility: CLINIC | Age: 44
End: 2024-03-11
Payer: COMMERCIAL

## 2024-03-14 DIAGNOSIS — K21.9 GASTROESOPHAGEAL REFLUX DISEASE, UNSPECIFIED WHETHER ESOPHAGITIS PRESENT: ICD-10-CM

## 2024-03-15 PROCEDURE — RXMED WILLOW AMBULATORY MEDICATION CHARGE

## 2024-03-15 RX ORDER — OMEPRAZOLE 40 MG/1
40 CAPSULE, DELAYED RELEASE ORAL
Qty: 30 CAPSULE | Refills: 1 | Status: SHIPPED | OUTPATIENT
Start: 2024-03-15 | End: 2024-05-14

## 2024-03-16 ENCOUNTER — PHARMACY VISIT (OUTPATIENT)
Dept: PHARMACY | Facility: CLINIC | Age: 44
End: 2024-03-16
Payer: COMMERCIAL

## 2024-03-18 ENCOUNTER — APPOINTMENT (OUTPATIENT)
Dept: VASCULAR SURGERY | Facility: CLINIC | Age: 44
End: 2024-03-18
Payer: COMMERCIAL

## 2024-03-19 ENCOUNTER — HOSPITAL ENCOUNTER (OUTPATIENT)
Dept: VASCULAR MEDICINE | Facility: CLINIC | Age: 44
Discharge: HOME | End: 2024-03-19
Payer: COMMERCIAL

## 2024-03-19 DIAGNOSIS — I83.893 SYMPTOMATIC VARICOSE VEINS OF BOTH LOWER EXTREMITIES: ICD-10-CM

## 2024-03-19 PROCEDURE — 93970 EXTREMITY STUDY: CPT

## 2024-03-19 PROCEDURE — 93970 EXTREMITY STUDY: CPT | Performed by: SURGERY

## 2024-03-27 ENCOUNTER — TELEPHONE (OUTPATIENT)
Dept: VASCULAR MEDICINE | Facility: CLINIC | Age: 44
End: 2024-03-27
Payer: COMMERCIAL

## 2024-04-01 ENCOUNTER — TELEPHONE (OUTPATIENT)
Dept: SURGERY | Facility: HOSPITAL | Age: 44
End: 2024-04-01
Payer: COMMERCIAL

## 2024-04-01 DIAGNOSIS — R11.10 REGURGITATION OF STOMACH CONTENTS: ICD-10-CM

## 2024-04-01 DIAGNOSIS — K44.9 HIATAL HERNIA: Primary | ICD-10-CM

## 2024-04-01 RX ORDER — OMEPRAZOLE 40 MG/1
40 CAPSULE, DELAYED RELEASE ORAL
Qty: 60 CAPSULE | Refills: 11 | Status: SHIPPED | OUTPATIENT
Start: 2024-04-01 | End: 2025-04-01

## 2024-04-02 NOTE — TELEPHONE ENCOUNTER
I increased ppi to 40 mg bid and recommended conversion of VSG to RYGB and hiatal hernia repair as discussed.

## 2024-04-05 PROCEDURE — RXMED WILLOW AMBULATORY MEDICATION CHARGE

## 2024-04-10 ENCOUNTER — APPOINTMENT (OUTPATIENT)
Dept: VASCULAR SURGERY | Facility: CLINIC | Age: 44
End: 2024-04-10
Payer: COMMERCIAL

## 2024-04-17 ENCOUNTER — PHARMACY VISIT (OUTPATIENT)
Dept: PHARMACY | Facility: CLINIC | Age: 44
End: 2024-04-17
Payer: COMMERCIAL

## 2024-05-01 ENCOUNTER — TELEPHONE (OUTPATIENT)
Dept: SURGERY | Facility: CLINIC | Age: 44
End: 2024-05-01
Payer: COMMERCIAL

## 2024-05-01 DIAGNOSIS — E55.9 VITAMIN D DEFICIENCY: ICD-10-CM

## 2024-05-01 DIAGNOSIS — K90.9 INTESTINAL MALABSORPTION, UNSPECIFIED TYPE (HHS-HCC): ICD-10-CM

## 2024-05-01 DIAGNOSIS — E53.8 VITAMIN B 12 DEFICIENCY: ICD-10-CM

## 2024-05-06 ENCOUNTER — OFFICE VISIT (OUTPATIENT)
Dept: VASCULAR SURGERY | Facility: CLINIC | Age: 44
End: 2024-05-06
Payer: COMMERCIAL

## 2024-05-06 VITALS
BODY MASS INDEX: 27.51 KG/M2 | WEIGHT: 149.5 LBS | DIASTOLIC BLOOD PRESSURE: 76 MMHG | HEIGHT: 62 IN | RESPIRATION RATE: 18 BRPM | SYSTOLIC BLOOD PRESSURE: 96 MMHG | HEART RATE: 66 BPM

## 2024-05-06 DIAGNOSIS — I87.2 PERIPHERAL VENOUS INSUFFICIENCY: ICD-10-CM

## 2024-05-06 DIAGNOSIS — I83.893 SYMPTOMATIC VARICOSE VEINS OF BOTH LOWER EXTREMITIES: Primary | ICD-10-CM

## 2024-05-06 PROCEDURE — 1036F TOBACCO NON-USER: CPT | Performed by: NURSE PRACTITIONER

## 2024-05-06 PROCEDURE — 99213 OFFICE O/P EST LOW 20 MIN: CPT | Performed by: NURSE PRACTITIONER

## 2024-05-06 ASSESSMENT — LIFESTYLE VARIABLES
HOW OFTEN DO YOU HAVE A DRINK CONTAINING ALCOHOL: NEVER
SKIP TO QUESTIONS 9-10: 1
AUDIT-C TOTAL SCORE: 0
HOW OFTEN DO YOU HAVE SIX OR MORE DRINKS ON ONE OCCASION: NEVER
HOW MANY STANDARD DRINKS CONTAINING ALCOHOL DO YOU HAVE ON A TYPICAL DAY: PATIENT DOES NOT DRINK

## 2024-05-06 ASSESSMENT — ENCOUNTER SYMPTOMS
LOSS OF SENSATION IN FEET: 0
DEPRESSION: 0
OCCASIONAL FEELINGS OF UNSTEADINESS: 0

## 2024-05-06 ASSESSMENT — PAIN SCALES - GENERAL: PAINLEVEL: 0-NO PAIN

## 2024-05-06 NOTE — PROGRESS NOTES
History Of Present Illness  Eileen Estrada is a 43 y.o. female presenting with symptomatic varicose veins.  Patient has had prior venous intervention at [facility], Dr. Morocho, approximately around 2018.  She has had prior left greater saphenous vein ablation.  She continues to have symptomatic varicose veins bilaterally and returns to the office today for follow-up after having a venous insufficiency study.  Prior history of DVT or phlebitis.  No claudication or ischemic rest pain.     Past Medical History  She has a past medical history of Acid reflux, Anxiety, Depression, ETOH abuse, History of sleep study, and Migraine.    Surgical History  She has a past surgical history that includes Laparoscopic partial gastrectomy (07/05/2016); Hiatal hernia repair (12/27/2016); Hysterectomy (02/2022); Abdominal surgery (02/2022); and Bilateral salpingoophorectomy (Bilateral, 2017).     Social History  She reports that she has quit smoking. Her smoking use included cigarettes. She has never been exposed to tobacco smoke. She has never used smokeless tobacco. She reports that she does not currently use alcohol. She reports that she does not use drugs.    Family History  Family History   Problem Relation Name Age of Onset    Stroke Mother      Other (CVA) Mother      Other (Chemical Dependancy) Father      No Known Problems Brother      No Known Problems Daughter      No Known Problems Son      No Known Problems Maternal Grandmother      Coronary artery disease Maternal Grandfather      Breast cancer Paternal Grandmother      No Known Problems Paternal Grandfather          Allergies  Patient has no known allergies.    Review of Systems    CONSTITUTIONAL: Denies weight loss, fever and chills.    HEENT: Denies changes in vision and hearing.    RESPIRATORY: Denies SOB and cough.    CV: Denies palpitations and CP.    GI: Denies abdominal pain, nausea, vomiting and diarrhea.    : Denies dysuria and  urinary frequency.    MSK: Denies myalgia and joint pain.    SKIN: Denies rash and pruritus.    VASC: Denies claudication, ischemic rest pain, or open wounds or sores.  Positive for symptomatic varicose veins bilaterally    NEUROLOGICAL: Denies headache and syncope.    PSYCHIATRIC: Denies recent changes in mood. Denies anxiety and depression.     Physical Exam    General: Pt is alert and oriented x 3. Pleasant, conversive  HEENT: Head is atraumatic, normocephalic.   Cardiac: Normal S1-S2.  Regular rate and rhythm.  No murmurs.  Respiratory: Lungs clear to auscultation.  No adventitious sounds.  Extremities: No significant edema noted.  Extremities are warm to the touch and normal in color.  No open wounds or sores.  3 to 4 mm moderate pressure varices scattered to bilateral lower extremities.  Neuro: Moves all extremities spontaneously.  No focal deficits.  Psych: Appropriate affect.  Answers questions appropriately.    CEAP C2S     Last Recorded Vitals  BP 96/76   Pulse 66   Resp 18   Wt 67.8 kg (149 lb 8 oz)     Relevant Results    Current Outpatient Medications   Medication Instructions    buPROPion XL (Wellbutrin XL) 300 mg 24 hr tablet TAKE 1 TABLET BY MOUTH EVERY MORNING    calcium citrate (Calcitrate) 200 mg (950 mg) tablet 2 tablets, oral, 2 times daily    cyanocobalamin (VITAMIN B-12) 1,000 mcg, intramuscular, Every 30 days    docusate sodium (COLACE) 100 mg, oral, Daily    famotidine (PEPCID) 40 mg, oral, 2 times daily    MULTIVITAMIN ORAL 1 tablet, oral, Daily    omeprazole (PRILOSEC) 40 mg, oral, Daily before breakfast    omeprazole (PRILOSEC) 40 mg, oral, 2 times daily before meals, Do not crush or chew.    SUMAtriptan (Imitrex) 50 mg tablet TAKE 1 TABLET BY MOUTH TWO TIMES A DAY AS NEEDED. WAIT AT LEAST 2 HOURS BETWEEN DOSES         Assessment/Plan   Symptomatic varicose veins bilaterally  Venous insufficiency    I reviewed the patient's venous reflux study which showed left greater saphenous vein  has been appropriately ablated.  Patient did have some venous reflux at the saphenofemoral junction on the right but none distally.  Saphenous vein was noted to have no significant reflux bilaterally.  At this time, I recommend sclerotherapy, 3 treatments per leg.  I discussed the risks, benefits, and alternative treatments with the patient and she states she understands and agrees to proceed.       Jose Bean, APRN-CNP

## 2024-05-09 ENCOUNTER — PHARMACY VISIT (OUTPATIENT)
Dept: PHARMACY | Facility: CLINIC | Age: 44
End: 2024-05-09
Payer: COMMERCIAL

## 2024-05-09 PROCEDURE — RXMED WILLOW AMBULATORY MEDICATION CHARGE

## 2024-05-10 PROCEDURE — RXMED WILLOW AMBULATORY MEDICATION CHARGE

## 2024-05-15 ENCOUNTER — PHARMACY VISIT (OUTPATIENT)
Dept: PHARMACY | Facility: CLINIC | Age: 44
End: 2024-05-15
Payer: COMMERCIAL

## 2024-05-15 PROCEDURE — RXMED WILLOW AMBULATORY MEDICATION CHARGE

## 2024-06-30 DIAGNOSIS — F41.9 ANXIETY: ICD-10-CM

## 2024-07-01 PROCEDURE — RXMED WILLOW AMBULATORY MEDICATION CHARGE

## 2024-07-01 RX ORDER — BUPROPION HYDROCHLORIDE 300 MG/1
300 TABLET ORAL
Qty: 90 TABLET | Refills: 1 | Status: SHIPPED | OUTPATIENT
Start: 2024-07-01 | End: 2025-07-01

## 2024-07-03 ENCOUNTER — PHARMACY VISIT (OUTPATIENT)
Dept: PHARMACY | Facility: CLINIC | Age: 44
End: 2024-07-03
Payer: COMMERCIAL

## 2024-07-03 PROCEDURE — RXMED WILLOW AMBULATORY MEDICATION CHARGE

## 2024-07-09 ENCOUNTER — PHARMACY VISIT (OUTPATIENT)
Dept: PHARMACY | Facility: CLINIC | Age: 44
End: 2024-07-09
Payer: COMMERCIAL

## 2024-07-09 PROCEDURE — RXMED WILLOW AMBULATORY MEDICATION CHARGE

## 2024-08-27 PROCEDURE — RXMED WILLOW AMBULATORY MEDICATION CHARGE

## 2024-09-04 ENCOUNTER — PATIENT MESSAGE (OUTPATIENT)
Dept: PRIMARY CARE | Facility: CLINIC | Age: 44
End: 2024-09-04
Payer: COMMERCIAL

## 2024-09-04 ENCOUNTER — PHARMACY VISIT (OUTPATIENT)
Dept: PHARMACY | Facility: CLINIC | Age: 44
End: 2024-09-04
Payer: COMMERCIAL

## 2024-09-04 DIAGNOSIS — G43.009 MIGRAINE WITHOUT AURA AND WITHOUT STATUS MIGRAINOSUS, NOT INTRACTABLE: Primary | ICD-10-CM

## 2024-09-04 PROCEDURE — RXMED WILLOW AMBULATORY MEDICATION CHARGE

## 2024-09-05 PROCEDURE — RXMED WILLOW AMBULATORY MEDICATION CHARGE

## 2024-09-05 RX ORDER — SUMATRIPTAN SUCCINATE 100 MG/1
100 TABLET ORAL ONCE AS NEEDED
Qty: 27 TABLET | Refills: 3 | Status: SHIPPED | OUTPATIENT
Start: 2024-09-05 | End: 2025-09-05

## 2024-09-10 ENCOUNTER — PHARMACY VISIT (OUTPATIENT)
Dept: PHARMACY | Facility: CLINIC | Age: 44
End: 2024-09-10
Payer: COMMERCIAL

## 2024-09-10 ENCOUNTER — APPOINTMENT (OUTPATIENT)
Dept: SURGERY | Facility: CLINIC | Age: 44
End: 2024-09-10
Payer: COMMERCIAL

## 2024-09-16 ENCOUNTER — LAB (OUTPATIENT)
Dept: LAB | Facility: LAB | Age: 44
End: 2024-09-16
Payer: COMMERCIAL

## 2024-09-16 DIAGNOSIS — K90.9 INTESTINAL MALABSORPTION, UNSPECIFIED TYPE (HHS-HCC): ICD-10-CM

## 2024-09-16 DIAGNOSIS — E53.8 VITAMIN B 12 DEFICIENCY: ICD-10-CM

## 2024-09-16 DIAGNOSIS — E55.9 VITAMIN D DEFICIENCY: ICD-10-CM

## 2024-09-16 LAB
25(OH)D3 SERPL-MCNC: 36 NG/ML (ref 31–100)
ALBUMIN SERPL-MCNC: 4.3 G/DL (ref 3.5–5)
ALP BLD-CCNC: 56 U/L (ref 35–125)
ALT SERPL-CCNC: 14 U/L (ref 5–40)
ANION GAP SERPL CALC-SCNC: 12 MMOL/L
AST SERPL-CCNC: 19 U/L (ref 5–40)
BASOPHILS # BLD AUTO: 0.07 X10*3/UL (ref 0–0.1)
BASOPHILS NFR BLD AUTO: 0.8 %
BILIRUB SERPL-MCNC: <0.2 MG/DL (ref 0.1–1.2)
BUN SERPL-MCNC: 15 MG/DL (ref 8–25)
CALCIUM SERPL-MCNC: 9.5 MG/DL (ref 8.5–10.4)
CHLORIDE SERPL-SCNC: 102 MMOL/L (ref 97–107)
CO2 SERPL-SCNC: 26 MMOL/L (ref 24–31)
CREAT SERPL-MCNC: 0.9 MG/DL (ref 0.4–1.6)
EGFRCR SERPLBLD CKD-EPI 2021: 81 ML/MIN/1.73M*2
EOSINOPHIL # BLD AUTO: 0.08 X10*3/UL (ref 0–0.7)
EOSINOPHIL NFR BLD AUTO: 0.9 %
ERYTHROCYTE [DISTWIDTH] IN BLOOD BY AUTOMATED COUNT: 13 % (ref 11.5–14.5)
FERRITIN SERPL-MCNC: 45 NG/ML (ref 13–150)
FOLATE SERPL-MCNC: 13 NG/ML (ref 4.2–19.9)
GLUCOSE SERPL-MCNC: 78 MG/DL (ref 65–99)
HCT VFR BLD AUTO: 41.7 % (ref 36–46)
HGB BLD-MCNC: 13.9 G/DL (ref 12–16)
IMM GRANULOCYTES # BLD AUTO: 0.02 X10*3/UL (ref 0–0.7)
IMM GRANULOCYTES NFR BLD AUTO: 0.2 % (ref 0–0.9)
IRON SATN MFR SERPL: 23 % (ref 12–50)
IRON SERPL-MCNC: 84 UG/DL (ref 30–160)
LYMPHOCYTES # BLD AUTO: 2.59 X10*3/UL (ref 1.2–4.8)
LYMPHOCYTES NFR BLD AUTO: 30 %
MCH RBC QN AUTO: 30.6 PG (ref 26–34)
MCHC RBC AUTO-ENTMCNC: 33.3 G/DL (ref 32–36)
MCV RBC AUTO: 92 FL (ref 80–100)
MONOCYTES # BLD AUTO: 0.63 X10*3/UL (ref 0.1–1)
MONOCYTES NFR BLD AUTO: 7.3 %
NEUTROPHILS # BLD AUTO: 5.23 X10*3/UL (ref 1.2–7.7)
NEUTROPHILS NFR BLD AUTO: 60.8 %
NRBC BLD-RTO: 0 /100 WBCS (ref 0–0)
PLATELET # BLD AUTO: 257 X10*3/UL (ref 150–450)
POTASSIUM SERPL-SCNC: 4.3 MMOL/L (ref 3.4–5.1)
PROT SERPL-MCNC: 6.7 G/DL (ref 5.9–7.9)
PTH-INTACT SERPL-MCNC: 56 PG/ML (ref 18.5–88)
RBC # BLD AUTO: 4.54 X10*6/UL (ref 4–5.2)
SODIUM SERPL-SCNC: 140 MMOL/L (ref 133–145)
TIBC SERPL-MCNC: 371 UG/DL (ref 228–428)
UIBC SERPL-MCNC: 287 UG/DL (ref 110–370)
VIT B12 SERPL-MCNC: 630 PG/ML (ref 211–946)
WBC # BLD AUTO: 8.6 X10*3/UL (ref 4.4–11.3)

## 2024-09-16 PROCEDURE — 82728 ASSAY OF FERRITIN: CPT

## 2024-09-16 PROCEDURE — 83540 ASSAY OF IRON: CPT

## 2024-09-16 PROCEDURE — 85025 COMPLETE CBC W/AUTO DIFF WBC: CPT

## 2024-09-16 PROCEDURE — 82607 VITAMIN B-12: CPT

## 2024-09-16 PROCEDURE — 82525 ASSAY OF COPPER: CPT

## 2024-09-16 PROCEDURE — 36415 COLL VENOUS BLD VENIPUNCTURE: CPT

## 2024-09-16 PROCEDURE — 82306 VITAMIN D 25 HYDROXY: CPT

## 2024-09-16 PROCEDURE — 83970 ASSAY OF PARATHORMONE: CPT

## 2024-09-16 PROCEDURE — 82746 ASSAY OF FOLIC ACID SERUM: CPT

## 2024-09-16 PROCEDURE — 84425 ASSAY OF VITAMIN B-1: CPT

## 2024-09-16 PROCEDURE — 80053 COMPREHEN METABOLIC PANEL: CPT

## 2024-09-16 PROCEDURE — 83550 IRON BINDING TEST: CPT

## 2024-09-16 PROCEDURE — 84630 ASSAY OF ZINC: CPT

## 2024-09-17 ENCOUNTER — TELEPHONE (OUTPATIENT)
Dept: SURGERY | Facility: CLINIC | Age: 44
End: 2024-09-17
Payer: COMMERCIAL

## 2024-09-17 NOTE — TELEPHONE ENCOUNTER
LVM TO CALL THE OFFICE TO SCHEDULE FUV/ PATIENT HAD LABS DONE/ AND NEEDS TO COME IN TO REVIEW RESULTS/ & ANNUAL APPT.

## 2024-09-19 ENCOUNTER — APPOINTMENT (OUTPATIENT)
Dept: RADIOLOGY | Facility: HOSPITAL | Age: 44
End: 2024-09-19
Payer: COMMERCIAL

## 2024-09-19 DIAGNOSIS — Z12.31 SCREENING MAMMOGRAM FOR BREAST CANCER: ICD-10-CM

## 2024-09-19 LAB
COPPER SERPL-MCNC: 95 UG/DL (ref 80–155)
ZINC SERPL-MCNC: 70.5 UG/DL (ref 60–120)

## 2024-09-21 LAB — VIT B1 PYROPHOSHATE BLD-SCNC: 130 NMOL/L (ref 70–180)

## 2024-09-27 PROCEDURE — RXMED WILLOW AMBULATORY MEDICATION CHARGE

## 2024-10-01 ENCOUNTER — PHARMACY VISIT (OUTPATIENT)
Dept: PHARMACY | Facility: CLINIC | Age: 44
End: 2024-10-01
Payer: COMMERCIAL

## 2024-10-01 PROCEDURE — RXMED WILLOW AMBULATORY MEDICATION CHARGE

## 2024-10-28 ENCOUNTER — APPOINTMENT (OUTPATIENT)
Dept: ORTHOPEDIC SURGERY | Facility: CLINIC | Age: 44
End: 2024-10-28
Payer: COMMERCIAL

## 2024-11-04 PROCEDURE — RXMED WILLOW AMBULATORY MEDICATION CHARGE

## 2024-11-05 ENCOUNTER — PHARMACY VISIT (OUTPATIENT)
Dept: PHARMACY | Facility: CLINIC | Age: 44
End: 2024-11-05
Payer: COMMERCIAL

## 2024-11-05 PROCEDURE — RXOTC WILLOW AMBULATORY OTC CHARGE

## 2024-11-18 ENCOUNTER — TELEPHONE (OUTPATIENT)
Dept: SURGERY | Facility: CLINIC | Age: 44
End: 2024-11-18
Payer: COMMERCIAL

## 2024-11-18 NOTE — TELEPHONE ENCOUNTER
REQUESTING APPT WITH DR. NEGRETE, TO REVIEW REVISION   RECEIVED MESSAGE FROM PATIENT COORDINATOR TO SCHEDULE APPT/ CHECKED WITH , PER  PATIENT DOES NOT NEED AN UGI/ PATIENT HAD LABS DONE IN SEPTEMBER (PATIENT MISSED THE ANNUAL FUV AT THAT TIME) ATTEMPT TO CONTACT PATIENT BY PHONE UNSUCCESSFUL DUE TO VM IS FULL.  SENT [place] MESSAGE REGARDING SCHEDULED ON DEC 5TH AT 1 PM, PLEASE CALL THE OFFICE IF THIS TIME DOES NOT WORK FOR YOU.

## 2024-11-22 ENCOUNTER — HOSPITAL ENCOUNTER (OUTPATIENT)
Dept: RADIOLOGY | Facility: HOSPITAL | Age: 44
Discharge: HOME | End: 2024-11-22
Payer: COMMERCIAL

## 2024-11-22 VITALS — BODY MASS INDEX: 28.52 KG/M2 | WEIGHT: 155 LBS | HEIGHT: 62 IN

## 2024-11-22 DIAGNOSIS — Z12.31 SCREENING MAMMOGRAM FOR BREAST CANCER: ICD-10-CM

## 2024-11-22 PROCEDURE — 77067 SCR MAMMO BI INCL CAD: CPT

## 2024-11-27 NOTE — PROGRESS NOTES
Subjective   Patient ID: Eileen Estrada is a 44 y.o. female who presents for No chief complaint on file..  HPI  Discuss revision  START WT: 234 IDEAL WT: 137 START EXCESS:103  twl: 77 ewl: 75 %   HT:  62.5   IN.   Review of Systems  Bariatric Surgery F/U:          Seen at ER after surgery? No.  Hospital admission? DEHYDRATION  Bariatric reoperation? No.  Bariatric intervention? No.  Was anticoagulation initiated for presumed/confirmed Vein Thrombosis/PE? No.  Was an incisional hernia noted on exam? No.  Sleep Apnea? No.  Still using C-PAP? No.  GERD req. meds? YES Hyperlipidemia? No.  Still taking Cholesterol med? No.  Hypertension? No.  Still taking HTN med? No.  Number of Anti-hypertensive Medications: 0.  Diabetes? No.  Still taking DM med? No.  Current DM medication type: _____.         CONSTITUTIONAL:          Chills No.  Fatigue No.  Fever No.         CARDIOLOGY:          Negative for dizziness, chest pain, palpitations, shortness of breath.         RESPIRATORY:          Negative for chest congestion, cough, wheezing.         GASTROENTEROLOGY:          Food Intolerance No.  Acid reflux yes Abdominal pain No.  Black stools No.  Constipation No.  Diarrhea No.  Loss of appetite no.  Nausea yes/ takes zofran.  Vomiting No.         UROLOGY:          Negative for dysuria, urinary urgency, kidney stones.         PSYCHOLOGY:          Negative for depression, anxiety, high stress level.   Objective   Physical Exam    Assessment/Plan            Marcie Mauricio LPN 11/27/24 3:59 PM

## 2024-11-29 PROCEDURE — RXMED WILLOW AMBULATORY MEDICATION CHARGE

## 2024-12-05 ENCOUNTER — APPOINTMENT (OUTPATIENT)
Dept: SURGERY | Facility: CLINIC | Age: 44
End: 2024-12-05
Payer: COMMERCIAL

## 2024-12-05 VITALS
HEIGHT: 62 IN | HEART RATE: 67 BPM | DIASTOLIC BLOOD PRESSURE: 73 MMHG | SYSTOLIC BLOOD PRESSURE: 111 MMHG | BODY MASS INDEX: 28.89 KG/M2 | WEIGHT: 157 LBS

## 2024-12-05 DIAGNOSIS — R11.10 REGURGITATION OF STOMACH CONTENTS: ICD-10-CM

## 2024-12-05 DIAGNOSIS — Z90.3 HISTORY OF SLEEVE GASTRECTOMY: ICD-10-CM

## 2024-12-05 DIAGNOSIS — K90.9 INTESTINAL MALABSORPTION, UNSPECIFIED TYPE (HHS-HCC): ICD-10-CM

## 2024-12-05 DIAGNOSIS — K44.9 HIATAL HERNIA: Primary | ICD-10-CM

## 2024-12-05 PROCEDURE — 99214 OFFICE O/P EST MOD 30 MIN: CPT | Performed by: SURGERY

## 2024-12-05 PROCEDURE — 3008F BODY MASS INDEX DOCD: CPT | Performed by: SURGERY

## 2024-12-05 ASSESSMENT — ENCOUNTER SYMPTOMS
OCCASIONAL FEELINGS OF UNSTEADINESS: 0
DEPRESSION: 0
LOSS OF SENSATION IN FEET: 0

## 2024-12-05 ASSESSMENT — COLUMBIA-SUICIDE SEVERITY RATING SCALE - C-SSRS
6. HAVE YOU EVER DONE ANYTHING, STARTED TO DO ANYTHING, OR PREPARED TO DO ANYTHING TO END YOUR LIFE?: NO
1. IN THE PAST MONTH, HAVE YOU WISHED YOU WERE DEAD OR WISHED YOU COULD GO TO SLEEP AND NOT WAKE UP?: NO
2. HAVE YOU ACTUALLY HAD ANY THOUGHTS OF KILLING YOURSELF?: NO

## 2024-12-05 ASSESSMENT — PATIENT HEALTH QUESTIONNAIRE - PHQ9
2. FEELING DOWN, DEPRESSED OR HOPELESS: NOT AT ALL
1. LITTLE INTEREST OR PLEASURE IN DOING THINGS: NOT AT ALL
SUM OF ALL RESPONSES TO PHQ9 QUESTIONS 1 AND 2: 0

## 2024-12-05 ASSESSMENT — PAIN SCALES - GENERAL: PAINLEVEL_OUTOF10: 0-NO PAIN

## 2024-12-05 NOTE — H&P
History Of Present Illness  Eileen Estrada is a 44 y.o. woman here for a follow up appointment.  Eileen had a VSG in 2016 and has done well with her weight loss.  She has had reflux for the past two years.  She had an EGD last year that showed a small hiatal hernia with esophagitis.  She was scheduled for conversion of VSG to RYGB with [facility] last November but cancelled her surgery.  She has not been back to see me since.  She continues to have some breakthrough reflux despite being on a bid ppi over the past year.  She says her reflux started after her abdominoplasty.  Initially she was self treating with tums.  Her symptoms progressed so she started taking OTC pepcid.  She then graduated to OTC ppi.  When this did not work she received a prescription for PPI 40 mg daily.  She has been on bid ppi daily and occasionally takes tums.  She can't miss either dose of ppi.  She has some dysphagia to tougher textured foods.  She has experienced vomiting bile when she bent over to pick something up off the floor.  Last year, she had an upper GI showing a small hiatal hernia.  She had an EGD showing a normal volume and shape gastric sleeve without stricture.  She takes her vitamins consistently.  I reinforced the need for lifelong supplementation after weight loss surgery.     START WT: 234 IDEAL WT: 137 START EXCESS:103  twl: 77 ewl: 75 %   HT:  62.5   IN.     Past Medical History  She has a past medical history of Acid reflux, Anxiety, Depression, ETOH abuse, History of sleep study, and Migraine.    Surgical History  She has a past surgical history that includes Laparoscopic partial gastrectomy (07/05/2016); Hiatal hernia repair (12/27/2016); Hysterectomy (02/2022); Abdominal surgery (02/2022); and Bilateral salpingoophorectomy (Bilateral, 2017).     Social History  She reports that she has quit smoking. Her smoking use included cigarettes. She has never been exposed to tobacco smoke. She has never used smokeless tobacco.  She reports that she does not currently use alcohol. She reports that she does not use drugs.    Family History  Family History   Problem Relation Name Age of Onset    Stroke Mother      Other (CVA) Mother      Other (Chemical Dependancy) Father      No Known Problems Brother      No Known Problems Daughter      No Known Problems Son      No Known Problems Maternal Grandmother      Coronary artery disease Maternal Grandfather      Breast cancer Paternal Grandmother      No Known Problems Paternal Grandfather          Allergies  Patient has no known allergies.    Review of Systems   Bariatric Surgery F/U:          Seen at ER after surgery? No.  Hospital admission? DEHYDRATION  Bariatric reoperation? No.  Bariatric intervention? No.  Was anticoagulation initiated for presumed/confirmed Vein Thrombosis/PE? No.  Was an incisional hernia noted on exam? No.  Sleep Apnea? No.  Still using C-PAP? No.  GERD req. meds? YES Hyperlipidemia? No.  Still taking Cholesterol med? No.  Hypertension? No.  Still taking HTN med? No.  Number of Anti-hypertensive Medications: 0.  Diabetes? No.  Still taking DM med? No.  Current DM medication type: _____.         CONSTITUTIONAL:          Chills No.  Fatigue No.  Fever No.         CARDIOLOGY:          Negative for dizziness, chest pain, palpitations, shortness of breath.         RESPIRATORY:          Negative for chest congestion, cough, wheezing.         GASTROENTEROLOGY:          Food Intolerance No.  Acid reflux yes Abdominal pain No.  Black stools No.  Constipation No.  Diarrhea No.  Loss of appetite no.  Nausea yes/ takes zofran.  Vomiting No.         UROLOGY:          Negative for dysuria, urinary urgency, kidney stones.         PSYCHOLOGY:          Negative for depression, anxiety, high stress level.     Physical Exam         General Examination:         GENERAL APPEARANCE: alert and oriented x 3, Pleasant and cooperative, No Acute Distress.          HEENT: PERRLA.          NECK: no  "lymphadenopathy, no thyromegaly, no JVD, normal flexion, normal extension.          HEART: regular rate and rhythm.          LUNGS: clear to auscultation bilaterally.          CHEST: normal shape and expansion.          ABDOMEN: no hernias present, soft and not tender, no guarding, no CVA tenderness.          EXTREMITIES: no edema, no ulcerations.          SKIN: normal, no rash, tattoos.          NEUROLOGIC EXAM: CN's II-XII grossly intact, gait normal.          BACK: no CVA tenderness.       Last Recorded Vitals  Blood pressure 111/73, pulse 67, height 1.575 m (5' 2\"), weight 71.2 kg (157 lb).    Relevant Results   Latest Reference Range & Units 09/16/24 15:28   GLUCOSE 65 - 99 mg/dL 78   SODIUM 133 - 145 mmol/L 140   POTASSIUM 3.4 - 5.1 mmol/L 4.3   CHLORIDE 97 - 107 mmol/L 102   Bicarbonate 24 - 31 mmol/L 26   Anion Gap <=19 mmol/L 12   Blood Urea Nitrogen 8 - 25 mg/dL 15   Creatinine 0.40 - 1.60 mg/dL 0.90   EGFR >60 mL/min/1.73m*2 81   Calcium 8.5 - 10.4 mg/dL 9.5   Albumin 3.5 - 5.0 g/dL 4.3   Alkaline Phosphatase 35 - 125 U/L 56   ALT 5 - 40 U/L 14   AST 5 - 40 U/L 19   Bilirubin Total 0.1 - 1.2 mg/dL <0.2   FERRITIN 13 - 150 ng/mL 45   FOLATE 4.2 - 19.9 ng/mL 13.0   Total Protein 5.9 - 7.9 g/dL 6.7   IRON 30 - 160 ug/dL 84   TIBC 228 - 428 ug/dL 371   UIBC 110 - 370 ug/dL 287   % Saturation 12 - 50 % 23   Vitamin B12 211 - 946 pg/mL 630   Copper 80.0 - 155.0 ug/dL 95.0   Zinc, Serum or Plasma 60.0 - 120.0 ug/dL 70.5   Vitamin B1, Whole Blood 70 - 180 nmol/L 130   Parathyroid Hormone, Intact 18.5 - 88.0 pg/mL 56.0   Vitamin D, 25-Hydroxy, Total 31 - 100 ng/mL 36   WBC 4.4 - 11.3 x10*3/uL 8.6   nRBC 0.0 - 0.0 /100 WBCs 0.0   RBC 4.00 - 5.20 x10*6/uL 4.54   HEMOGLOBIN 12.0 - 16.0 g/dL 13.9   HEMATOCRIT 36.0 - 46.0 % 41.7   MCV 80 - 100 fL 92   MCH 26.0 - 34.0 pg 30.6   MCHC 32.0 - 36.0 g/dL 33.3   RED CELL DISTRIBUTION WIDTH 11.5 - 14.5 % 13.0   Platelets 150 - 450 x10*3/uL 257   Neutrophils % 40.0 - 80.0 % " 60.8   Immature Granulocytes %, Automated 0.0 - 0.9 % 0.2   Lymphocytes % 13.0 - 44.0 % 30.0   Monocytes % 2.0 - 10.0 % 7.3   Eosinophils % 0.0 - 6.0 % 0.9   Basophils % 0.0 - 2.0 % 0.8   Neutrophils Absolute 1.20 - 7.70 x10*3/uL 5.23   Immature Granulocytes Absolute, Automated 0.00 - 0.70 x10*3/uL 0.02   Lymphocytes Absolute 1.20 - 4.80 x10*3/uL 2.59   Monocytes Absolute 0.10 - 1.00 x10*3/uL 0.63   Eosinophils Absolute 0.00 - 0.70 x10*3/uL 0.08   Basophils Absolute 0.00 - 0.10 x10*3/uL 0.07     FL upper GI double contrast W  KUB  Status: Final result     Signed by    Signed Time Phone Pager   Chu Bedoya MD 10/28/2022 10:28 [phone]      Exam Information    Status Exam Begun Exam Ended   Final 10/28/2022 09:48      Study Result    Narrative & Impression   PROCEDURE:         UGI AIR CONTRAST W KUB - IXR  0133  REASON FOR EXAM: GASTRO-ESOPHAGEAL REFLUX DISEASE WITHOUT ESOPHAGITIS     RESULT: MRN: [id number]  Patient Name: GA RUTHERFORD     STUDY:  UGI AIR CONTRAST W KUB; 10/28/2022 9:48 am     INDICATION:  GASTRO-ESOPHAGEAL REFLUX DISEASE WITHOUT ESOPHAGITIS     COMPARISON:  None.     ACCESSION NUMBER(S):  [id number]     ORDERING CLINICIAN:  SHANNON NEGRETE     TECHNIQUE:  See below     FINDINGS:  Fluoroscopy was provided during double-contrast upper GI study.  Total fluoroscopy time: 2 min  36 seconds, images: 3 cine loops, 9 spot  images.     On  films cholecystectomy clips are noted. Initiation of the swallowing  mechanism is intact. There is no laryngeal penetration or aspiration.  The esophagus is normal in course and caliber, without permanent filling  defects or strictures.  There is a small hiatal hernia, with only mild gastroesophageal reflux.  Gastric sleeve surgery is noted; there is normal postsurgical appearance of  the stomach.  The gastric pylorus opens readily. The duodenal bulb and C-loop appear  normal in configuration.     IMPRESSION:  Small hiatal hernia and mild gastroesophageal reflux  on this patient with  previous gastric sleeve surgery. No esophageal strictures or permanent  filling defects. No gastric or duodenal ulcers..  Dictation workstation:   [id number]     Original Interpreting Physician:   BRUNILDA SEGOVIA M.D.  Original Transcribed by/Date: MMODAL Oct 28 2022  9:06A  Original Electronically Signed by/Date: BRUNILDA SEGOVIA M.D. 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  I explained that she was at risk for Stephens's esophagus, esophageal cancer, esophageal dysmotility, aspiration pneumonia as well as the risks of being on bid ppi.  I explained that I was not comfortable with her not addressing her severe reflux and regurgitation symptoms as I don't feel she has any other options to address this.  I did offer her a second opinion with another bariatric surgeon.  I explained that hernia repair alone would not be a durable or reasonable management choice.  I explained the differences between VSG and RYGB.  I explained dumping syndrome, sensitivity to alcohol, lifelong risk of gastrojejunal ulcer and need to avoid smoking, ASA, NSAIDs.  I answered Eileen's questions.  She will speak with her  and family and let me know if she has any questions.  We reviewed her labwork.  We reviewed Eileen's labwork.  I recommended at least annual labwork.         I spent 43 minutes in the professional and overall care of this patient.  15 minute prep-review records in [facility] and e[facility]  28 minutes face to face    Jordy Greco MD

## 2024-12-09 PROBLEM — Z90.3 HISTORY OF SLEEVE GASTRECTOMY: Status: ACTIVE | Noted: 2024-12-09

## 2024-12-13 ENCOUNTER — PHARMACY VISIT (OUTPATIENT)
Dept: PHARMACY | Facility: CLINIC | Age: 44
End: 2024-12-13
Payer: COMMERCIAL

## 2025-01-06 PROCEDURE — RXMED WILLOW AMBULATORY MEDICATION CHARGE

## 2025-01-08 ENCOUNTER — APPOINTMENT (OUTPATIENT)
Dept: PRIMARY CARE | Facility: CLINIC | Age: 45
End: 2025-01-08
Payer: COMMERCIAL

## 2025-01-11 ENCOUNTER — PHARMACY VISIT (OUTPATIENT)
Dept: PHARMACY | Facility: CLINIC | Age: 45
End: 2025-01-11
Payer: COMMERCIAL

## 2025-01-15 DIAGNOSIS — K90.9 INTESTINAL MALABSORPTION, UNSPECIFIED TYPE (HHS-HCC): ICD-10-CM

## 2025-01-15 DIAGNOSIS — K21.9 GASTROESOPHAGEAL REFLUX DISEASE, UNSPECIFIED WHETHER ESOPHAGITIS PRESENT: ICD-10-CM

## 2025-01-16 RX ORDER — CYANOCOBALAMIN 1000 UG/ML
1000 INJECTION, SOLUTION INTRAMUSCULAR; SUBCUTANEOUS
Qty: 3 ML | Refills: 3 | Status: SHIPPED | OUTPATIENT
Start: 2025-01-16 | End: 2026-01-16

## 2025-01-16 RX ORDER — OMEPRAZOLE 40 MG/1
40 CAPSULE, DELAYED RELEASE ORAL
Qty: 30 CAPSULE | Refills: 1 | Status: SHIPPED | OUTPATIENT
Start: 2025-01-16 | End: 2025-03-17

## 2025-02-06 ENCOUNTER — APPOINTMENT (OUTPATIENT)
Dept: SURGERY | Facility: CLINIC | Age: 45
End: 2025-02-06
Payer: COMMERCIAL